# Patient Record
Sex: FEMALE | Race: WHITE | NOT HISPANIC OR LATINO | URBAN - NONMETROPOLITAN AREA
[De-identification: names, ages, dates, MRNs, and addresses within clinical notes are randomized per-mention and may not be internally consistent; named-entity substitution may affect disease eponyms.]

---

## 2017-07-15 ENCOUNTER — OFFICE VISIT (OUTPATIENT)
Dept: RETAIL CLINIC | Facility: CLINIC | Age: 22
End: 2017-07-15

## 2017-07-15 DIAGNOSIS — Z13.9 SCREENING: Primary | ICD-10-CM

## 2017-07-15 NOTE — PROGRESS NOTES
Subjective   Erick Beal is a 21 y.o. female.     Reason for Appointment  1. escreen    History of Present Illness  HPI:   See scanned document. See custody control form.    Assessments  1. Encounter for screening, unspecified - Z13.9         July 15, 2017 2:50 PM

## 2018-11-05 ENCOUNTER — OFFICE VISIT (OUTPATIENT)
Dept: RETAIL CLINIC | Facility: CLINIC | Age: 23
End: 2018-11-05

## 2018-11-05 DIAGNOSIS — Z13.9 ENCOUNTER FOR SCREENING: Primary | ICD-10-CM

## 2018-11-05 NOTE — PROGRESS NOTES
Subjective   Erick Beal is a 23 y.o. female.     Reason for Appointment  1. escreen    History of Present Illness  HPI:   See scanned document. See custody control form.    Assessments  1. Encounter for screening, unspecified - Z13.9

## 2023-05-30 ENCOUNTER — OFFICE VISIT (OUTPATIENT)
Dept: CARDIOLOGY | Facility: CLINIC | Age: 28
End: 2023-05-30

## 2023-05-30 VITALS
BODY MASS INDEX: 25.49 KG/M2 | HEART RATE: 85 BPM | SYSTOLIC BLOOD PRESSURE: 118 MMHG | WEIGHT: 153 LBS | DIASTOLIC BLOOD PRESSURE: 80 MMHG | HEIGHT: 65 IN | OXYGEN SATURATION: 99 %

## 2023-05-30 DIAGNOSIS — R01.1 HEART MURMUR: ICD-10-CM

## 2023-05-30 DIAGNOSIS — R00.2 PALPITATIONS: Primary | ICD-10-CM

## 2023-05-30 PROCEDURE — 93000 ELECTROCARDIOGRAM COMPLETE: CPT | Performed by: INTERNAL MEDICINE

## 2023-05-30 PROCEDURE — 99204 OFFICE O/P NEW MOD 45 MIN: CPT | Performed by: INTERNAL MEDICINE

## 2023-05-30 RX ORDER — LEVONORGESTREL AND ETHINYL ESTRADIOL AND ETHINYL ESTRADIOL 150-30(84)
1 KIT ORAL DAILY
COMMUNITY
Start: 2023-05-10

## 2023-05-30 RX ORDER — HYDROXYZINE PAMOATE 25 MG/1
CAPSULE ORAL
COMMUNITY
Start: 2023-05-11

## 2023-05-30 RX ORDER — ESCITALOPRAM OXALATE 10 MG/1
10 TABLET ORAL DAILY
COMMUNITY
Start: 2023-05-11

## 2023-05-30 NOTE — LETTER
May 30, 2023     PATTY Jasso  [address]  [address]  [place] [address]    Patient: Erick Beal   YOB: 1995   Date of Visit: 5/30/2023       Dear PATTY Jasso,    Erick Beal was in my office today. Below are the relevant portions of my assessment and plan of care.           If you have questions, please do not hesitate to call me. I look forward to following Erick along with you.         Sincerely,        Melina Long MD        CC: No Recipients

## 2023-05-30 NOTE — LETTER
May 30, 2023     PATTY Jasso  [address]  [address]  [place] [address]    Patient: Erick Beal   YOB: 1995   Date of Visit: 5/30/2023       Dear PATTY Jasso    Erick Beal was in my office today. Below is a copy of my note.    If you have questions, please do not hesitate to call me. I look forward to following Erick along with you.         Sincerely,        Melina Long MD        CC: No Recipients    Subjective    Chief Complaint   Patient presents with   • Establish Care   • Palpitations     Pt stopped caffeine and symptoms have improved       History of Present Illness    Patient is 27 years old white female who is here for cardiac evaluation because of palpitations and heart murmur.    She states that she was born with a heart murmur but she was told that she  had outgrown the murmur in childhood.    She is physically very active and is asymptomatic except for palpitations.  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  When she leaves work, she feels heart and has palpitations heart flutters but no syncope or presyncope.  She denies any dizziness.  No chest pain or shortness of breath.    She stopped using caffeine and felt some better.  No history of rheumatic fever but had heart murmur during childhood.    She also has no hyperlipidemia or diabetes mellitus in the past.    She does not smoke    History reviewed. No pertinent surgical history.  Family History   Problem Relation Age of Onset   • No Known Problems Mother    • No Known Problems Father    • No Known Problems Sister      Past Medical History:   Diagnosis Date   • Heart murmur     When I was a baby   • History of heart murmur        Patient Active Problem List   Diagnosis   • Palpitations   • History of heart murmur         Social History     Tobacco Use   • Smoking status: Never   Substance Use Topics   • Alcohol use: Not Currently     Comment: Once a month   • Drug use: Never         The  "following portions of the patient's history were reviewed and updated as appropriate: allergies, current medications, past family history, past medical history, past social history, past surgical history and problem list.    Allergies   Allergen Reactions   • Amoxicillin Hives     As a child   • Penicillins Hives         Current Outpatient Medications:   •  Ashlyna 0.15-0.03 &0.01 MG tablet, Take 1 tablet by mouth Daily., Disp: , Rfl:   •  escitalopram (LEXAPRO) 10 MG tablet, Take 1 tablet by mouth Daily. (Patient not taking: Reported on 5/30/2023), Disp: , Rfl:   •  hydrOXYzine pamoate (VISTARIL) 25 MG capsule, TAKE 1 CAPSULE BY MOUTH 4 TIMES DAILY AS NEEDED (Patient not taking: Reported on 5/30/2023), Disp: , Rfl:     Review of Systems   Constitutional: Negative.   HENT: Negative.  Negative for congestion.    Eyes: Negative.    Cardiovascular: Positive for palpitations. Negative for chest pain, cyanosis, dyspnea on exertion, irregular heartbeat, leg swelling, near-syncope, orthopnea, paroxysmal nocturnal dyspnea and syncope.   Respiratory: Negative.  Negative for shortness of breath.    Hematologic/Lymphatic: Negative.    Musculoskeletal: Negative.    Gastrointestinal: Negative.    Neurological: Negative.  Negative for headaches.       Objective       /80 (BP Location: Left arm, Patient Position: Sitting, Cuff Size: Adult)   Pulse 85   Ht 165.1 cm (65\")   Wt 69.4 kg (153 lb)   SpO2 99%   BMI 25.46 kg/m²     Pulmonary:      Effort: Pulmonary effort is normal.      Breath sounds: Normal breath sounds. No stridor. No wheezing. No rhonchi. No rales.   Cardiovascular:      PMI at left midclavicular line. Normal rate. Regular rhythm. Normal S1. Normal S2.      Murmurs: There is no murmur.      No gallop. No click. No rub.   Pulses:     Intact distal pulses.   Edema:     Peripheral edema absent.         Lab Review:  orderd      ECG 12 Lead    Date/Time: 5/30/2023 3:14 PM  Performed by: Melina Long, " MD  Authorized by: Melina Long MD   Comparison: compared with previous ECG from 11/8/2023  Similar to previous ECG  Comparison to previous ECG: EKG from 11/8/2023 is labeled 11/8/2024 for which is a future date  Rhythm: sinus rhythm  Rate: normal  BPM: 85  Conduction: conduction normal  ST Segments: ST segments normal  T Waves: T waves normal  QRS axis: normal  Other: no other findings    Clinical impression: normal ECG            I reviewed the patient's new clinical results.  I personally viewed and interpreted the patient's EKG       Assessment:   Diagnosis Plan   1. Palpitations  Adult Transthoracic Echo Complete W/ Cont if Necessary Per Protocol    ECG 12 Lead      2. History of heart murmur               Plan:  Patient is 27 years old white female who complains of palpitations.  She also has history of heart murmur, physical examination is normal at this time.  EKG does not show any acute changes.    Cause of palpitations is not clear she will have thyroid functions checked along with lipid panel and CMP.  2 weeks Holter monitor was arranged.  Echocardiogram was scheduled for evaluation of heart murmur.  Follow-up scheduled    Thank you for giving me the oppertunity to participate in your patient's cardiac care.    Sincerely,    JOHANNA Long M.D. [facility]     No follow-ups on file.

## 2023-05-30 NOTE — PROGRESS NOTES
Subjective   Chief Complaint   Patient presents with   • Establish Care   • Palpitations     Pt stopped caffeine and symptoms have improved       History of Present Illness    Patient is 27 years old white female who is here for cardiac evaluation because of palpitations and heart murmur.    She states that she was born with a heart murmur but she was told that she  had outgrown the murmur in childhood.    She is physically very active and is asymptomatic except for palpitations.  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  When she leaves work, she feels heart and has palpitations heart flutters but no syncope or presyncope.  She denies any dizziness.  No chest pain or shortness of breath.    She stopped using caffeine and felt some better.  No history of rheumatic fever but had heart murmur during childhood.    She also has no hyperlipidemia or diabetes mellitus in the past.    She does not smoke    History reviewed. No pertinent surgical history.  Family History   Problem Relation Age of Onset   • No Known Problems Mother    • No Known Problems Father    • No Known Problems Sister      Past Medical History:   Diagnosis Date   • Heart murmur     When I was a baby   • History of heart murmur        Patient Active Problem List   Diagnosis   • Palpitations   • History of heart murmur         Social History     Tobacco Use   • Smoking status: Never   Substance Use Topics   • Alcohol use: Not Currently     Comment: Once a month   • Drug use: Never         The following portions of the patient's history were reviewed and updated as appropriate: allergies, current medications, past family history, past medical history, past social history, past surgical history and problem list.    Allergies   Allergen Reactions   • Amoxicillin Hives     As a child   • Penicillins Hives         Current Outpatient Medications:   •  Ashlyna 0.15-0.03 &0.01 MG tablet, Take 1 tablet by mouth Daily., Disp: , Rfl:   •  escitalopram  "(LEXAPRO) 10 MG tablet, Take 1 tablet by mouth Daily. (Patient not taking: Reported on 5/30/2023), Disp: , Rfl:   •  hydrOXYzine pamoate (VISTARIL) 25 MG capsule, TAKE 1 CAPSULE BY MOUTH 4 TIMES DAILY AS NEEDED (Patient not taking: Reported on 5/30/2023), Disp: , Rfl:     Review of Systems   Constitutional: Negative.   HENT: Negative.  Negative for congestion.    Eyes: Negative.    Cardiovascular: Positive for palpitations. Negative for chest pain, cyanosis, dyspnea on exertion, irregular heartbeat, leg swelling, near-syncope, orthopnea, paroxysmal nocturnal dyspnea and syncope.   Respiratory: Negative.  Negative for shortness of breath.    Hematologic/Lymphatic: Negative.    Musculoskeletal: Negative.    Gastrointestinal: Negative.    Neurological: Negative.  Negative for headaches.        Objective      /80 (BP Location: Left arm, Patient Position: Sitting, Cuff Size: Adult)   Pulse 85   Ht 165.1 cm (65\")   Wt 69.4 kg (153 lb)   SpO2 99%   BMI 25.46 kg/m²     Pulmonary:      Effort: Pulmonary effort is normal.      Breath sounds: Normal breath sounds. No stridor. No wheezing. No rhonchi. No rales.   Cardiovascular:      PMI at left midclavicular line. Normal rate. Regular rhythm. Normal S1. Normal S2.      Murmurs: There is no murmur.      No gallop. No click. No rub.   Pulses:     Intact distal pulses.   Edema:     Peripheral edema absent.         Lab Review:  orderd      ECG 12 Lead    Date/Time: 5/30/2023 3:14 PM  Performed by: Melina Long MD  Authorized by: Melina Long MD   Comparison: compared with previous ECG from 11/8/2023  Similar to previous ECG  Comparison to previous ECG: EKG from 11/8/2023 is labeled 11/8/2024 for which is a future date  Rhythm: sinus rhythm  Rate: normal  BPM: 85  Conduction: conduction normal  ST Segments: ST segments normal  T Waves: T waves normal  QRS axis: normal  Other: no other findings    Clinical impression: normal ECG            I reviewed " the patient's new clinical results.  I personally viewed and interpreted the patient's EKG        Assessment:   Diagnosis Plan   1. Palpitations  Adult Transthoracic Echo Complete W/ Cont if Necessary Per Protocol    ECG 12 Lead      2. History of heart murmur               Plan:  Patient is 27 years old white female who complains of palpitations.  She also has history of heart murmur, physical examination is normal at this time.  EKG does not show any acute changes.    Cause of palpitations is not clear she will have thyroid functions checked along with lipid panel and CMP.  2 weeks Holter monitor was arranged.  Echocardiogram was scheduled for evaluation of heart murmur.  Follow-up scheduled    Thank you for giving me the oppertunity to participate in your patient's cardiac care.    Sincerely,    JOHANNA Long M.D. [facility]     No follow-ups on file.

## 2023-06-06 ENCOUNTER — TELEPHONE (OUTPATIENT)
Dept: CARDIOLOGY | Facility: CLINIC | Age: 28
End: 2023-06-06
Payer: COMMERCIAL

## 2023-06-06 NOTE — TELEPHONE ENCOUNTER
Caller: Erick Beal    Relationship to patient: Self    Best call back number: [phone]    Patient is needing: A CALL BACK MISSED THE CALL, THANK YOU

## 2023-06-07 ENCOUNTER — TELEPHONE (OUTPATIENT)
Dept: CARDIOLOGY | Facility: CLINIC | Age: 28
End: 2023-06-07

## 2023-06-30 PROBLEM — E03.9 HYPOTHYROIDISM (ACQUIRED): Status: ACTIVE | Noted: 2023-06-30

## 2023-06-30 PROBLEM — F41.1 GENERALIZED ANXIETY DISORDER: Status: ACTIVE | Noted: 2023-06-30

## 2025-01-15 ENCOUNTER — TRANSCRIBE ORDERS (OUTPATIENT)
Dept: ADMINISTRATIVE | Facility: HOSPITAL | Age: 30
End: 2025-01-15
Payer: COMMERCIAL

## 2025-01-15 DIAGNOSIS — H90.11 CONDUCTIVE HEARING LOSS, UNILATERAL, RIGHT EAR, WITH UNRESTRICTED HEARING ON THE CONTRALATERAL SIDE: Primary | ICD-10-CM

## 2025-01-17 ENCOUNTER — HOSPITAL ENCOUNTER (OUTPATIENT)
Dept: CT IMAGING | Facility: HOSPITAL | Age: 30
Discharge: HOME | End: 2025-01-17
Admitting: OTOLARYNGOLOGY
Payer: COMMERCIAL

## 2025-01-17 DIAGNOSIS — H90.11 CONDUCTIVE HEARING LOSS, UNILATERAL, RIGHT EAR, WITH UNRESTRICTED HEARING ON THE CONTRALATERAL SIDE: ICD-10-CM

## 2025-01-17 PROCEDURE — 70480 CT ORBIT/EAR/FOSSA W/O DYE: CPT

## 2025-01-17 PROCEDURE — 70480 CT ORBIT/EAR/FOSSA W/O DYE: CPT | Performed by: RADIOLOGY
